# Patient Record
(demographics unavailable — no encounter records)

---

## 2025-02-19 NOTE — ASSESSMENT
[FreeTextEntry1] : 95-year-old male, status post percutaneous screw fixation left femoral neck fracture -He has not regained his strength from his preinjury status.  He has been using a walker and been ambulating with assistance.  He is having some leg weakness expected giving the devastating nature on ambulatory status of hip fractures and being bedbound.  He has no pain throughout his hip region and his incision is well-healed.  His x-rays demonstrate a healing femoral neck fracture, no evidence of avascular necrosis or nonunion at this point -I discussed with Jhony as well as his daughter the plan going forward.  He should continue physical therapy and mobility is encouraged.  We discussed that this may take several months to continue to improve but that he may never return to his preinjury state. -He is still on Lovenox I recommend continue this for 30 days postoperatively and then they may discontinue from my point of view -Pain control with Tylenol as needed they are going to avoid NSAIDs until he is done with the Lovenox -Follow-up in 6 to 8 weeks .

## 2025-02-19 NOTE — PHYSICAL EXAM
[de-identified] : Left lower extremity -Incision inspected, staples previously removed from the wound is well-healed no erythema redness or drainage no scar tenderness -He is able to stand up with assistance, no pain in his hip when standing -EHL FHL tib ant intact, denies numbness or tingling in his leg -No pain with internal/external rotation of his hip -No tenderness palpation throughout the entirety of his leg [de-identified] : X-ray today including AP pelvis and lateral of left hip were taken today and personally reviewed these demonstrate healing femoral neck fracture no change in alignment, no change in screw alignment

## 2025-02-19 NOTE — HISTORY OF PRESENT ILLNESS
[FreeTextEntry1] : 95-year-old male, here is here approximately status post percutaneous screw fixation left femoral neck fracture.  He was initially in rehab where his staples were removed by the inpatient provider.  He is subsequent discharged home.  He has full-time nursing assistance.  He is here with his daughter today who helps in his history.  He has been ambulating using a walker.  He has pain in his anterior leg, no incisional pain.  He has no hip pain.

## 2025-02-24 NOTE — PHYSICAL EXAM
[de-identified] : Left lower extremity -Incision inspected, staples previously removed from the wound is well-healed no erythema redness or drainage no scar tenderness, there is no tenderness over the scar -He is able to stand up with assistance, no pain in his hip when standing -EHL FHL tib ant intact, denies numbness or tingling in his leg -No pain with internal/external rotation of his hip, no pain with flexion or extension of the hip although not in his wheelchair -Today on examination there is diffuse tenderness throughout his leg, there is tenderness to palpation of the muscle of the quadriceps distally, medially the musculature as well as the IT band laterally, no tenderness over the groin.  No tenderness over the lumbar spine. [de-identified] : AP pelvis, AP and lateral hip, full view femur were taken today and personally viewed, no change in alignment of the fracture since last week's x-rays, there has been no migration of the hardware.  No significant arthritic change, distal femur and femoral shaft reviewed as well today no evidence of fracture or dislocation.

## 2025-02-24 NOTE — ASSESSMENT
[FreeTextEntry1] : - Jhony is here with his daughter today we discussed potential etiology for this pain, has been worsening over the past few days after last being seen, there is been no history of injury or trauma recently.  My examination today is consistent with muscular pain throughout the entirety of the left leg, there is point tenderness on multiple locations, he does not have any pain with hip rotation do not think is related to his hip fracture or hardware.  We discussed that patient can get trochanteric bursitis from the screw irritation but he is not tender specifically in this location it is more diffuse.  We discussed how this may be related to physical therapy and overuse type of activities and that this leg pain may wax and wane over time. --Jhony does have anxiety regarding the injury and the healing process.  Did reassure him today that the x-rays have not changed since last week and that overall does appear to be healing well.  We again discussed that the fracture takes approximately 6 to 8 weeks to heal however the muscle atrophy weakness in the leg can last for several months and may never return to baseline. -He does not appear to be in acute distress on my examination, -His daughter did ask about Neurontin today, did not recommend the medication as I do not believe he is having significant nerve pain today and this can cause drowsiness and lead to falls He had an ultrasound done at his facility to ensure that there was no DVT.  He also has had no calf pain. -He is going to finish his Lovenox in a few days, discussed with daughter they can add ibuprofen or anti-inflammatories for his treatment as well and this may increase his pain control. -Initially to be scheduled to be seen in 6 weeks, I like to see him sooner to ensure that there is continued improvement he will follow-up in 3 to 4 weeks at this time.

## 2025-02-24 NOTE — HISTORY OF PRESENT ILLNESS
[FreeTextEntry1] : 96-year-old male, he is here with his daughter again today.  He over the past few days has had worsening leg pain.  He has pain and he points to his anterior thigh, medial thigh as well as lateral thigh.  He has been working with therapy otherwise have been able to do less then prior when he was seen last 1 week ago.  No new falls or injury.  They called to be requested to be evaluated sooner.